# Patient Record
(demographics unavailable — no encounter records)

---

## 2025-02-20 NOTE — HISTORY OF PRESENT ILLNESS
[FreeTextEntry1] : 77 yo male with BPH and urinary retention. As per patient's son, he flew him back from [place] to be able to better care for the patient. The patient had a harden catheter in place but unsure when it was placed or for what reason. He had been taking tamsulosin 0.4 mg daily and this was increased to 0.8 mg but developed dizziness and was lowered back to 0.4 mg.  He started finasteride 5 mg daily April 2024.  States he has no bothersome urinary symptoms  PVR today 240 ml but asked to double void with PVR -200 ml ( previously 195 ml)

## 2025-02-20 NOTE — ASSESSMENT
[FreeTextEntry1] : 77 yo male with BPH and urinary retention. He has been voiding without issue since decreasing tamsulosin to 0.4 mg daily.  He is continued on his finasteride (started April 2024) and no urinary complaints.   Plan PVR done today 200 ml after double voiding (previously 195 mL) Urinalysis Urine culture Continue  tamsulosin 0.4 mg daily: Refilled Continue finasteride 5 mg daily: Refilled Follow-up in 12 months for PVR   Patient is being seen today for evaluation and management of a chronic and longitudinal ongoing condition and I am of the primary treating physician.